# Patient Record
Sex: FEMALE | Race: WHITE | NOT HISPANIC OR LATINO | URBAN - METROPOLITAN AREA
[De-identification: names, ages, dates, MRNs, and addresses within clinical notes are randomized per-mention and may not be internally consistent; named-entity substitution may affect disease eponyms.]

---

## 2019-02-11 NOTE — PATIENT DISCUSSION
Total Retina Detachement noted at today’s visit. We recommended the patient have surgery. Explained to the patient that PPV (23G) with Membrane Peel (ILM Removal with ICG), Endolaser, 18% C3F8 will take about 90 minutes. We explained the process of the gas bubble, head positioning, as well as MAC and Block anesthetics. We asked the patient whether they have had any major heart/lung/strokes/seizures etc. within the past 6-12 months that would require medical clearance from their PCP. The patient states they have. We asked whether the patient is currently on any blood thinners. They state they are . Informational pamphlets about Vitrectomy surgeries as well as a handout on what to expect pre-/post-operation where handed to the patient today as well as appointment cards for their upcoming surgery. The patient will not need [facility] transportation the day of surgery. Post-op drops were placed into the patients pharmacy today.

## 2019-02-14 NOTE — PATIENT DISCUSSION
Total Retina Detachement noted at today’s visit. We recommended the patient have surgery. Explained to the patient that PPV (23G) with Membrane Peel (ILM Removal with ICG), Endolaser, 18% C3F8 will take about 90 minutes. We explained the process of the gas bubble, head positioning, as well as MAC and Block anesthetics. We asked the patient whether they have had any major heart/lung/strokes/seizures etc. within the past 6-12 months that would require medical clearance from their PCP. The patient states they have. We asked whether the patient is currently on any blood thinners. They state they are . Informational pamphlets about Vitrectomy surgeries as well as a handout on what to expect pre-/post-operation where handed to the patient today as well as appointment cards for their upcoming surgery. The patient will not need [employer] [employer] transportation the day of surgery. Post-op drops were placed into the patients pharmacy today.

## 2019-02-14 NOTE — PATIENT DISCUSSION
Post-op instructions given. Discussed drops, positioning, no strenuous activity and eye protection. Call immediately if eye pain or loss of vision. Pred and Moxi qid.  Eye shield to be worn at night.

## 2019-02-15 NOTE — PATIENT DISCUSSION
Total Retina Detachement noted at today’s visit. We recommended the patient have surgery. Explained to the patient that PPV (23G) with Membrane Peel (ILM Removal with ICG), Endolaser, 18% C3F8 will take about 90 minutes. We explained the process of the gas bubble, head positioning, as well as MAC and Block anesthetics. We asked the patient whether they have had any major heart/lung/strokes/seizures etc. within the past 6-12 months that would require medical clearance from their PCP. The patient states they have. We asked whether the patient is currently on any blood thinners. They state they are . Informational pamphlets about Vitrectomy surgeries as well as a handout on what to expect pre-/post-operation where handed to the patient today as well as appointment cards for their upcoming surgery. The patient will not need [place] transportation the day of surgery. Post-op drops were placed into the patients pharmacy today.

## 2019-02-22 NOTE — PATIENT DISCUSSION
Explained post-operative information to the patient. The patient will have a red and sore eye for about a week. This is completely normal. If the pain becomes severe, we ask the patient to come  back in immediately, 24/7 ER discussed. We provide post-operative drop instructions and an eye patch for the patient to wear at night to protect the eye from abrasions and excess rubbing of the eye while sleeping. This will be handed out the day after surgery. We ask that the patient avoid excess water in the eye when showering and try to avoid swimming. This will help reduce the possibility of infection. We ask the patient to sleep on their side or stomach while in bed so the air bubble placed in during surgery is positioned correctly and that they remain in head down positioning for the first day or two so the healing process is more complete. We also asked the patient not to participate in strenuous activity and not to lift more than 20lbs for the first few weeks after the surgery.

## 2019-02-22 NOTE — PATIENT DISCUSSION
Total Retina Detachement noted at today’s visit. We recommended the patient have surgery. Explained to the patient that PPV (23G) with Membrane Peel (ILM Removal with ICG), Endolaser, 18% C3F8 will take about 90 minutes. We explained the process of the gas bubble, head positioning, as well as MAC and Block anesthetics. We asked the patient whether they have had any major heart/lung/strokes/seizures etc. within the past 6-12 months that would require medical clearance from their PCP. The patient states they have. We asked whether the patient is currently on any blood thinners. They state they are . Informational pamphlets about Vitrectomy surgeries as well as a handout on what to expect pre-/post-operation where handed to the patient today as well as appointment cards for their upcoming surgery. The patient will not need [employer] transportation the day of surgery. Post-op drops were placed into the patients pharmacy today.

## 2019-03-01 NOTE — PATIENT DISCUSSION
Post op week 2. Doing well, retina attached 360. Pt may now sleep on side. Pred qid. D/C Moxi. Small gas bubble has moved to the [facility]. We ask that the patient avoid excess water in the eye when showering and try to avoid swimming. This will help reduce the possibility of infection. Pt advised not to fly. Will see pt back in 3 weeks.

## 2019-03-01 NOTE — PATIENT DISCUSSION
Total Retina Detachement noted at today’s visit. We recommended the patient have surgery. Explained to the patient that PPV (23G) with Membrane Peel (ILM Removal with ICG), Endolaser, 18% C3F8 will take about 90 minutes. We explained the process of the gas bubble, head positioning, as well as MAC and Block anesthetics. We asked the patient whether they have had any major heart/lung/strokes/seizures etc. within the past 6-12 months that would require medical clearance from their PCP. The patient states they have. We asked whether the patient is currently on any blood thinners. They state they are . Informational pamphlets about Vitrectomy surgeries as well as a handout on what to expect pre-/post-operation where handed to the patient today as well as appointment cards for their upcoming surgery. The patient will not need [employer] transportation the day of surgery. Post-op drops were placed into the patients pharmacy today.

## 2019-03-22 NOTE — PATIENT DISCUSSION
Post op week 5. Doing well, retina attached 360. Pt may now sleep on side. Continue Pred qid.  Pt advised not to fly. Will see pt back in 4 weeks.

## 2019-03-22 NOTE — PATIENT DISCUSSION
Total Retina Detachement noted at today’s visit. We recommended the patient have surgery. Explained to the patient that PPV (23G) with Membrane Peel (ILM Removal with ICG), Endolaser, 18% C3F8 will take about 90 minutes. We explained the process of the gas bubble, head positioning, as well as MAC and Block anesthetics. We asked the patient whether they have had any major heart/lung/strokes/seizures etc. within the past 6-12 months that would require medical clearance from their PCP. The patient states they have. We asked whether the patient is currently on any blood thinners. They state they are . Informational pamphlets about Vitrectomy surgeries as well as a handout on what to expect pre-/post-operation where handed to the patient today as well as appointment cards for their upcoming surgery. The patient will not need [facility] transportation the day of surgery. Post-op drops were placed into the patients pharmacy today.

## 2019-04-19 NOTE — PATIENT DISCUSSION
Total Retina Detachement noted at today’s visit. We recommended the patient have surgery. Explained to the patient that PPV (23G) with Membrane Peel (ILM Removal with ICG), Endolaser, 18% C3F8 will take about 90 minutes. We explained the process of the gas bubble, head positioning, as well as MAC and Block anesthetics. We asked the patient whether they have had any major heart/lung/strokes/seizures etc. within the past 6-12 months that would require medical clearance from their PCP. The patient states they have. We asked whether the patient is currently on any blood thinners. They state they are . Informational pamphlets about Vitrectomy surgeries as well as a handout on what to expect pre-/post-operation where handed to the patient today as well as appointment cards for their upcoming surgery. The patient will not need [place] transportation the day of surgery. Post-op drops were placed into the patients pharmacy today.

## 2019-04-19 NOTE — PATIENT DISCUSSION
Post op Month 2. Doing well, retina attached 360. Patient to continue to sleep on side. Continue Pred- start taper-3, 2, 1 @ 2 week intervals.  Pt advised not to fly. Will see pt back in 6-7 weeks.

## 2019-05-31 NOTE — PATIENT DISCUSSION
Total Retina Detachement noted at today’s visit. We recommended the patient have surgery. Explained to the patient that PPV (23G) with Membrane Peel (ILM Removal with ICG), Endolaser, 18% C3F8 will take about 90 minutes. We explained the process of the gas bubble, head positioning, as well as MAC and Block anesthetics. We asked the patient whether they have had any major heart/lung/strokes/seizures etc. within the past 6-12 months that would require medical clearance from their PCP. The patient states they have. We asked whether the patient is currently on any blood thinners. They state they are . Informational pamphlets about Vitrectomy surgeries as well as a handout on what to expect pre-/post-operation where handed to the patient today as well as appointment cards for their upcoming surgery. The patient will not need [employer] transportation the day of surgery. Post-op drops were placed into the patients pharmacy today.

## 2019-05-31 NOTE — PATIENT DISCUSSION
Early posterior capsular opacification was noted. It was not causing any decreased vision or glare at this time. No treatment was recommended at this time. [facility] contacted reports approximately a 20 minute wait before pt. Will be taken to [facility].

## 2019-06-07 NOTE — PATIENT DISCUSSION
Total Retina Detachement noted at today’s visit. We recommended the patient have surgery. Explained to the patient that PPV (23G) with Membrane Peel (ILM Removal with ICG), Endolaser, 18% C3F8 will take about 90 minutes. We explained the process of the gas bubble, head positioning, as well as MAC and Block anesthetics. We asked the patient whether they have had any major heart/lung/strokes/seizures etc. within the past 6-12 months that would require medical clearance from their PCP. The patient states they have. We asked whether the patient is currently on any blood thinners. They state they are . Informational pamphlets about Vitrectomy surgeries as well as a handout on what to expect pre-/post-operation where handed to the patient today as well as appointment cards for their upcoming surgery. The patient will not need [employer] transportation the day of surgery. Post-op drops were placed into the patients pharmacy today.

## 2019-07-09 NOTE — PATIENT DISCUSSION
Total Retina Detachement noted at today’s visit. We recommended the patient have surgery. Explained to the patient that PPV (23G) with Membrane Peel (ILM Removal with ICG), Endolaser, 18% C3F8 will take about 90 minutes. We explained the process of the gas bubble, head positioning, as well as MAC and Block anesthetics. We asked the patient whether they have had any major heart/lung/strokes/seizures etc. within the past 6-12 months that would require medical clearance from their PCP. The patient states they have. We asked whether the patient is currently on any blood thinners. They state they are . Informational pamphlets about Vitrectomy surgeries as well as a handout on what to expect pre-/post-operation where handed to the patient today as well as appointment cards for their upcoming surgery. The patient will not need [facility] transportation the day of surgery. Post-op drops were placed into the patients pharmacy today.

## 2019-08-28 NOTE — PATIENT DISCUSSION
Total Retina Detachement noted at today’s visit. We recommended the patient have surgery. Explained to the patient that PPV (23G) with Membrane Peel (ILM Removal with ICG), Endolaser, 18% C3F8 will take about 90 minutes. We explained the process of the gas bubble, head positioning, as well as MAC and Block anesthetics. We asked the patient whether they have had any major heart/lung/strokes/seizures etc. within the past 6-12 months that would require medical clearance from their PCP. The patient states they have. We asked whether the patient is currently on any blood thinners. They state they are . Informational pamphlets about Vitrectomy surgeries as well as a handout on what to expect pre-/post-operation where handed to the patient today as well as appointment cards for their upcoming surgery. The patient will not need [employer] transportation the day of surgery. Post-op drops were placed into the patients pharmacy today.

## 2019-12-03 NOTE — PATIENT DISCUSSION
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 Recommend Tobradex jovita to help with inflammation along with WC twice a day for a coupleof  weeks. If no improvement/resolution, recommend biopsy on follow up with [facility].

## 2019-12-03 NOTE — PATIENT DISCUSSION
Total Retina Detachement noted at today’s visit. We recommended the patient have surgery. Explained to the patient that PPV (23G) with Membrane Peel (ILM Removal with ICG), Endolaser, 18% C3F8 will take about 90 minutes. We explained the process of the gas bubble, head positioning, as well as MAC and Block anesthetics. We asked the patient whether they have had any major heart/lung/strokes/seizures etc. within the past 6-12 months that would require medical clearance from their PCP. The patient states they have. We asked whether the patient is currently on any blood thinners. They state they are . Informational pamphlets about Vitrectomy surgeries as well as a handout on what to expect pre-/post-operation where handed to the patient today as well as appointment cards for their upcoming surgery. The patient will not need [facility] transportation the day of surgery. Post-op drops were placed into the patients pharmacy today.

## 2019-12-14 NOTE — PATIENT DISCUSSION
Early posterior capsular opacification was noted. It was not causing any decreased vision or glare at this time. No treatment was recommended at this time. 14-Dec-2019 18:53 14-Dec-2019 19:20

## 2020-01-14 NOTE — PATIENT DISCUSSION
Total Retina Detachement noted at today’s visit. We recommended the patient have surgery. Explained to the patient that PPV (23G) with Membrane Peel (ILM Removal with ICG), Endolaser, 18% C3F8 will take about 90 minutes. We explained the process of the gas bubble, head positioning, as well as MAC and Block anesthetics. We asked the patient whether they have had any major heart/lung/strokes/seizures etc. within the past 6-12 months that would require medical clearance from their PCP. The patient states they have. We asked whether the patient is currently on any blood thinners. They state they are . Informational pamphlets about Vitrectomy surgeries as well as a handout on what to expect pre-/post-operation where handed to the patient today as well as appointment cards for their upcoming surgery. The patient will not need [facility] transportation the day of surgery. Post-op drops were placed into the patients pharmacy today.

## 2020-03-02 NOTE — PATIENT DISCUSSION
Total Retina Detachement noted at today’s visit. We recommended the patient have surgery. Explained to the patient that PPV (23G) with Membrane Peel (ILM Removal with ICG), Endolaser, 18% C3F8 will take about 90 minutes. We explained the process of the gas bubble, head positioning, as well as MAC and Block anesthetics. We asked the patient whether they have had any major heart/lung/strokes/seizures etc. within the past 6-12 months that would require medical clearance from their PCP. The patient states they have. We asked whether the patient is currently on any blood thinners. They state they are . Informational pamphlets about Vitrectomy surgeries as well as a handout on what to expect pre-/post-operation where handed to the patient today as well as appointment cards for their upcoming surgery. The patient will not need [employer] transportation the day of surgery. Post-op drops were placed into the patients pharmacy today.

## 2021-06-25 ENCOUNTER — IMPORTED ENCOUNTER (OUTPATIENT)
Dept: URBAN - METROPOLITAN AREA CLINIC 9 | Facility: CLINIC | Age: 61
End: 2021-06-25

## 2021-06-25 ENCOUNTER — IMPORTED ENCOUNTER (OUTPATIENT)
Age: 61
End: 2021-06-25

## 2021-06-25 PROBLEM — H25.9: Noted: 2021-06-25

## 2021-06-25 PROBLEM — D31.32: Noted: 2021-06-25

## 2021-06-25 PROBLEM — H40.013: Noted: 2021-06-25

## 2021-06-25 PROBLEM — H04.123: Noted: 2021-06-25

## 2021-10-02 ASSESSMENT — VISUAL ACUITY
OS_CC: 20/20 -2 SN
OD_CC: 20/20 -2 SN
OD_SC: 20/60 -2 SN
OD_PH: 20/30 -2 SN
OS_PH: 20/25 SN
OS_SC: 20/40 -2 SN

## 2021-10-02 ASSESSMENT — TONOMETRY
OS_IOP_MMHG: 14
OD_IOP_MMHG: 16

## 2021-11-12 ASSESSMENT — VISUAL ACUITY
OS_SC: 20/40 -2 SN
OS_CC: 20/20 -2 SN
OD_PH: 20/30 -2 SN
OS_PH: 20/25 SN
OD_CC: 20/20 -2 SN
OD_SC: 20/60 -2 SN

## 2021-11-12 ASSESSMENT — TONOMETRY
OS_IOP_MMHG: 14
OD_IOP_MMHG: 16

## 2021-12-14 ENCOUNTER — ESTABLISHED PATIENT (OUTPATIENT)
Dept: URBAN - METROPOLITAN AREA CLINIC 9 | Facility: CLINIC | Age: 61
End: 2021-12-14

## 2021-12-14 DIAGNOSIS — H02.834: ICD-10-CM

## 2021-12-14 DIAGNOSIS — H02.831: ICD-10-CM

## 2021-12-14 PROCEDURE — 99213 OFFICE O/P EST LOW 20 MIN: CPT

## 2021-12-14 PROCEDURE — 92285 EXTERNAL OCULAR PHOTOGRAPHY: CPT

## 2021-12-14 ASSESSMENT — VISUAL ACUITY
OS_CC: 20/20-2
OD_CC: 20/20-2

## 2022-03-02 NOTE — PATIENT DISCUSSION
Total Retina Detachement noted at today’s visit. We recommended the patient have surgery. Explained to the patient that PPV (23G) with Membrane Peel (ILM Removal with ICG), Endolaser, 18% C3F8 will take about 90 minutes. We explained the process of the gas bubble, head positioning, as well as MAC and Block anesthetics. We asked the patient whether they have had any major heart/lung/strokes/seizures etc. within the past 6-12 months that would require medical clearance from their PCP. The patient states they have. We asked whether the patient is currently on any blood thinners. They state they are . Informational pamphlets about Vitrectomy surgeries as well as a handout on what to expect pre-/post-operation where handed to the patient today as well as appointment cards for their upcoming surgery. The patient will not need [facility] transportation the day of surgery. Post-op drops were placed into the patients pharmacy today.

## 2022-06-23 RX ORDER — MECLIZINE HYDROCHLORIDE 25 MG/1
TABLET ORAL
COMMUNITY
Start: 2017-09-13

## 2022-06-23 RX ORDER — TRIAMCINOLONE ACETONIDE 5 MG/G
OINTMENT TOPICAL
COMMUNITY
Start: 2020-10-20

## 2022-06-23 RX ORDER — VALACYCLOVIR HYDROCHLORIDE 500 MG/1
TABLET, FILM COATED ORAL
COMMUNITY

## 2022-06-23 RX ORDER — IBUPROFEN 200 MG
TABLET ORAL
COMMUNITY

## 2024-02-17 NOTE — PATIENT DISCUSSION
Call immediately to report any decreased vision or visual distortion.
Consult with REANNA PRN.
Discussed the importance of blood pressure control in the prevention of ocular complications.
Discussed the use of warm compresses.
Doing well, retina attached 360.
Doxyclycline PO.
Early posterior capsular opacification was noted. It was not causing any decreased vision or glare at this time. No treatment was recommended at this time.
Glasses Rx given.
Grade II hypertensive retinopathy.
No retinal detachment or retinal tear noted.
Observe.
Patient understands increased risk of persistent corneal edema. Monitor.
Recommended against surgery at this time given that patient is happy with present vision.
Recommended observation.
Retinal detachment warnings given.
Retinal tear and detachment warning symptoms reviewed and patient instructed to call immediately if increasing floaters, flashes, or decreasing peripheral vision.
Stable.
Total Retina Detachement noted at today’s visit. We recommended the patient have surgery. Explained to the patient that PPV (23G) with Membrane Peel (ILM Removal with ICG), Endolaser, 18% C3F8 will take about 90 minutes. We explained the process of the gas bubble, head positioning, as well as MAC and Block anesthetics. We asked the patient whether they have had any major heart/lung/strokes/seizures etc. within the past 6-12 months that would require medical clearance from their PCP. The patient states they have. We asked whether the patient is currently on any blood thinners. They state they are . Informational pamphlets about Vitrectomy surgeries as well as a handout on what to expect pre-/post-operation where handed to the patient today as well as appointment cards for their upcoming surgery. The patient will not need [facility] transportation the day of surgery. Post-op drops were placed into the patients pharmacy today.
Well healed.
improved, using tears QID, not helping. F/U with Dr. Kendra Horton for plugs.
show

## 2025-05-28 ENCOUNTER — NEW PATIENT (OUTPATIENT)
Age: 65
End: 2025-05-28

## 2025-05-28 DIAGNOSIS — H25.13: ICD-10-CM

## 2025-05-28 DIAGNOSIS — H02.834: ICD-10-CM

## 2025-05-28 DIAGNOSIS — H04.123: ICD-10-CM

## 2025-05-28 DIAGNOSIS — H02.831: ICD-10-CM

## 2025-05-28 DIAGNOSIS — D31.32: ICD-10-CM

## 2025-05-28 PROCEDURE — 92134 CPTRZ OPH DX IMG PST SGM RTA: CPT

## 2025-05-28 PROCEDURE — 92004 COMPRE OPH EXAM NEW PT 1/>: CPT

## 2025-05-28 PROCEDURE — 92015 DETERMINE REFRACTIVE STATE: CPT
